# Patient Record
Sex: FEMALE | Race: WHITE | NOT HISPANIC OR LATINO
[De-identification: names, ages, dates, MRNs, and addresses within clinical notes are randomized per-mention and may not be internally consistent; named-entity substitution may affect disease eponyms.]

---

## 2017-01-03 ENCOUNTER — APPOINTMENT (OUTPATIENT)
Dept: RHEUMATOLOGY | Facility: CLINIC | Age: 37
End: 2017-01-03

## 2017-01-10 ENCOUNTER — APPOINTMENT (OUTPATIENT)
Dept: RHEUMATOLOGY | Facility: CLINIC | Age: 37
End: 2017-01-10

## 2017-01-18 ENCOUNTER — APPOINTMENT (OUTPATIENT)
Dept: RHEUMATOLOGY | Facility: CLINIC | Age: 37
End: 2017-01-18

## 2017-02-16 ENCOUNTER — APPOINTMENT (OUTPATIENT)
Dept: RHEUMATOLOGY | Facility: CLINIC | Age: 37
End: 2017-02-16

## 2017-11-28 ENCOUNTER — TRANSCRIPTION ENCOUNTER (OUTPATIENT)
Age: 37
End: 2017-11-28

## 2017-12-03 ENCOUNTER — TRANSCRIPTION ENCOUNTER (OUTPATIENT)
Age: 37
End: 2017-12-03

## 2019-07-30 ENCOUNTER — APPOINTMENT (OUTPATIENT)
Dept: SURGERY | Facility: CLINIC | Age: 39
End: 2019-07-30
Payer: COMMERCIAL

## 2019-07-30 VITALS
HEART RATE: 90 BPM | SYSTOLIC BLOOD PRESSURE: 119 MMHG | TEMPERATURE: 98.8 F | WEIGHT: 115 LBS | BODY MASS INDEX: 22.58 KG/M2 | RESPIRATION RATE: 16 BRPM | OXYGEN SATURATION: 98 % | HEIGHT: 59.75 IN | DIASTOLIC BLOOD PRESSURE: 77 MMHG

## 2019-07-30 DIAGNOSIS — Z80.7 FAMILY HISTORY OF OTHER MALIGNANT NEOPLASMS OF LYMPHOID, HEMATOPOIETIC AND RELATED TISSUES: ICD-10-CM

## 2019-07-30 PROCEDURE — 99213 OFFICE O/P EST LOW 20 MIN: CPT

## 2019-07-30 RX ORDER — USTEKINUMAB 130 MG/26ML
SOLUTION INTRAVENOUS
Refills: 0 | Status: ACTIVE | COMMUNITY

## 2019-07-30 NOTE — ASSESSMENT
[FreeTextEntry1] : In summary the patient has a tiny fascial defect at the superior aspect of her previous incision which likely corresponds to the hernia seen on imaging. Given her multiple abdominal surgeries including hernia repair with mesh I feel that the risk of surgery outweighs the benefit of the present time. Since she is minimally symptomatic and the hernia is small I feel that it should be observed. I will obtain her CT report and images from her recent hospitalization and will see her in one month for a checkup

## 2019-07-30 NOTE — HISTORY OF PRESENT ILLNESS
[de-identified] : The patient presents for evaluation of an abdominal wall hernia. She was recently admitted to [facility] with a Crohn's flare. I do not have the imaging results from that hospitalization however she was told that she has a recurrent hernia. Since discharge she has abdominal discomfort and bloating. She is starting Stelara. She denies nausea or vomiting. She denies drainage from her anal fistulae

## 2019-07-30 NOTE — CONSULT LETTER
[Consult Letter:] : I had the pleasure of evaluating your patient, [unfilled]. [Dear  ___] : Dear  [unfilled], [Consult Closing:] : Thank you very much for allowing me to participate in the care of this patient.  If you have any questions, please do not hesitate to contact me. [Please see my note below.] : Please see my note below. [Sincerely,] : Sincerely, [DrJameel  ___] : Dr. SHARMA [FreeTextEntry3] : Sheldon Damon M.D., F.A.C.S, F.A.S.C.R.S

## 2019-07-30 NOTE — PHYSICAL EXAM
[No HSM] : no hepatosplenomegaly [Abdominal Masses] : No abdominal masses [de-identified] : Soft, nontender, well-healed midline scar and right subcostal scar. There is a tiny nontender recurrent supraumbilical fascial defect/hernia without incarceration. [de-identified] : nad

## 2019-08-30 ENCOUNTER — APPOINTMENT (OUTPATIENT)
Dept: SURGERY | Facility: CLINIC | Age: 39
End: 2019-08-30

## 2021-06-10 DIAGNOSIS — R10.33 PERIUMBILICAL PAIN: ICD-10-CM

## 2021-06-11 ENCOUNTER — APPOINTMENT (OUTPATIENT)
Dept: SURGERY | Facility: CLINIC | Age: 41
End: 2021-06-11
Payer: COMMERCIAL

## 2021-06-11 VITALS
HEART RATE: 103 BPM | OXYGEN SATURATION: 99 % | DIASTOLIC BLOOD PRESSURE: 73 MMHG | HEIGHT: 59.75 IN | BODY MASS INDEX: 21.01 KG/M2 | WEIGHT: 107 LBS | TEMPERATURE: 97.3 F | SYSTOLIC BLOOD PRESSURE: 106 MMHG | RESPIRATION RATE: 17 BRPM

## 2021-06-11 DIAGNOSIS — R10.9 UNSPECIFIED ABDOMINAL PAIN: ICD-10-CM

## 2021-06-11 PROCEDURE — 99072 ADDL SUPL MATRL&STAF TM PHE: CPT

## 2021-06-11 PROCEDURE — 99213 OFFICE O/P EST LOW 20 MIN: CPT

## 2021-06-11 RX ORDER — CHROMIUM 200 MCG
TABLET ORAL
Refills: 0 | Status: ACTIVE | COMMUNITY

## 2021-06-11 NOTE — ASSESSMENT
[FreeTextEntry1] : In summary the patient has longstanding Crohn's disease with anal fistula a. She is presently on Stelara. Her main complaint is abdominal pain however her abdominal exam demonstrates no obvious large incisional hernia. She has a small upper midline fascial defect which is unchanged. I ordered a CT of the abdomen and pelvis to better define her abdominal wall anatomy. At the present time there is no indication for surgery as there is not an obvious large incisional hernia on exam.

## 2021-06-11 NOTE — PHYSICAL EXAM
[de-identified] : Soft, nontender, small reducible unchanged upper midline fascial defect approximately 1.5 cm in diameter. There is a well-healed midline scar and well-healed right subcostal incision. Her abdominal wall musculature is atrophied however there is no focal tenderness or obvious large recurrent incisional hernia

## 2021-06-11 NOTE — HISTORY OF PRESENT ILLNESS
[de-identified] : Rosa Elena is a 39 y/o female here for a reconsult for a paraumbilical hernia seen on son 6/26/19. \par She was last seen 7/30/19 Gastrointestinal:. Soft, nontender, well-healed midline scar and right subcostal scar. There is a tiny nontender recurrent supraumbilical fascial defect/hernia without incarceration. No abdominal masses. In summary the patient has a tiny fascial defect at the superior aspect of her previous incision which likely corresponds to the hernia seen on imaging. Given her multiple abdominal surgeries including hernia repair with mesh I feel that the risk of surgery outweighs the benefit of the present time. Since she is minimally symptomatic and the hernia is small I feel that it should be observed. ( I will obtain her CT report and images from her recent hospitalization and will see her in one month for a checkup.- not done)\par \par PT had last colonoscopy June 7, 2021 with Dr. Joshua Smith.\par \par Today pt reports abdominal pain 3/10. Pt reports 6 BMs daily, soft/diarrhea, no pain or bleeding with BMs. Pt reports loss of appetite and nausea daily due to pain, no vomiting. Pt reports discomfort in fistula during menstrual cycle, otherwise no issue. Pt reports L side and midline abdominal discomfort, feels 2 bulges. Pt not on anticoagulants. \par

## 2021-06-15 ENCOUNTER — OUTPATIENT (OUTPATIENT)
Dept: OUTPATIENT SERVICES | Facility: HOSPITAL | Age: 41
LOS: 1 days | End: 2021-06-15
Payer: COMMERCIAL

## 2021-06-15 ENCOUNTER — APPOINTMENT (OUTPATIENT)
Dept: CT IMAGING | Facility: CLINIC | Age: 41
End: 2021-06-15
Payer: COMMERCIAL

## 2021-06-15 DIAGNOSIS — R10.9 UNSPECIFIED ABDOMINAL PAIN: ICD-10-CM

## 2021-06-15 PROCEDURE — 74177 CT ABD & PELVIS W/CONTRAST: CPT

## 2021-06-15 PROCEDURE — 74177 CT ABD & PELVIS W/CONTRAST: CPT | Mod: 26,MH

## 2024-06-19 NOTE — CONSULT LETTER
[Dear  ___] : Dear  [unfilled], [Consult Letter:] : I had the pleasure of evaluating your patient, [unfilled]. [Please see my note below.] : Please see my note below. [Consult Closing:] : Thank you very much for allowing me to participate in the care of this patient.  If you have any questions, please do not hesitate to contact me. [Sincerely,] : Sincerely, [FreeTextEntry3] : Sheldon Damon M.D., F.A.C.S, F.A.S.C.R.S Name band;

## 2025-01-03 ENCOUNTER — APPOINTMENT (OUTPATIENT)
Dept: INTERNAL MEDICINE | Facility: CLINIC | Age: 45
End: 2025-01-03